# Patient Record
Sex: FEMALE | Employment: FULL TIME | URBAN - METROPOLITAN AREA
[De-identification: names, ages, dates, MRNs, and addresses within clinical notes are randomized per-mention and may not be internally consistent; named-entity substitution may affect disease eponyms.]

---

## 2022-02-11 ENCOUNTER — HOSPITAL ENCOUNTER (OUTPATIENT)
Dept: PHYSICAL THERAPY | Age: 22
Setting detail: THERAPIES SERIES
Discharge: HOME | End: 2022-02-11
Payer: COMMERCIAL

## 2022-02-11 PROCEDURE — 97161 PT EVAL LOW COMPLEX 20 MIN: CPT

## 2022-02-11 PROCEDURE — 97140 MANUAL THERAPY 1/> REGIONS: CPT

## 2022-02-11 NOTE — PLAN OF CARE
Rosalinda Puentes  Phone: [phone]   Fax:     [phone]                                                       Physical Therapy Certification    Dear Referring Practitioner: Mark Dias DO,    We had the pleasure of evaluating the following patient for physical therapy services at [address] [place] [address]. A summary of our findings can be found in the initial assessment below. This includes our plan of care. If you have any questions or concerns regarding these findings, please do not hesitate to contact me at the office phone number checked above. Thank you for the referral.       Physician Signature:_______________________________Date:__________________  By signing above (or electronic signature), therapists plan is approved by physician              Patient: Prescott Dakins   : 2000   MRN: [id number]  Referring Physician: Referring Practitioner: Mark Dias DO      Evaluation Date: 2022      Medical Diagnosis Information:  Diagnosis: bilateral elbow pain   Treatment Diagnosis: M25.521, M25.522                                         Insurance information: PT Insurance Information: [place] 20 visits    Precautions/ Contra-indications:   Latex Allergy:   [x]  NO      []YES  Preferred Language for Healthcare:   [x]English       []other:    C-SSRS Triggered by Intake questionnaire (Past 2 wk assessment ):   [x] No, Questionnaire did not trigger screening.   [] Yes, Patient intake triggered C-SSRS Screening      [] C-SSRS Screening completed  [] PCP notified via Epic     SUBJECTIVE: Patient stated complaint of bilateral elbow pain for several years duration. She is a club cheerleader who for [employer] and states that she has had pain since high school when she competed in gymnastics as well as cheerleading.  She states that most of her pain is during tumbling where her hands are on the ground and that she has taken a break from tumbling during cheer. She reports that she has some slight pain at rest following tumbling however, no  Increased night pain. She states that imaging done in high school revealed that she had open growth plates and that she likely would outgrow the pain when plates closed. She has not had recent imaging. She denies any diagnosis for EDS or other connective tissue disorder.     Relevant Medical History:NA  Functional Scale/Score: Quick DASH - 38%    Pain Scale: 3-8/10  Easing factors: rest  Provocative factors: tumbling, stunting     Type: []Constant   [x]Intermittent  []Radiating []Localized []other:     Numbness/Tingling: none    Occupation/School: student     Living Status/Prior Level of Function: Independent with ADLs and IADLs, collegiate club cheer    OBJECTIVE:     CERV ROM     Cervical Flexion WFL    Cervical Extension WFL    Cervical SB WFL WFL   Cervical rotation [facility] WFL        ROM Left Right   Shoulder Flex [facility]   Shoulder Abd     Shoulder ER     Shoulder IR     Elbow flexion [facility]   Elbow extension hyperextension noted Hyperextension noted   Strength  Left Right   Shoulder Flex 4+/5 4+/5   Shoulder Scap 4+/5 4+/5   Shoulder ER 4/5 4/5   Shoulder IR             Reflexes Normal Abnormal Comments   [x]ALL NORMAL            S1-2 Seated achilles [] []    S1-2 Prone knee bend [] []    L3-4 Patellar tendon [] []    C5-6 Biceps [] []    C6 Brachioradialis [] []    C7-8 Triceps [] []    Clonus [] []    Babinski [] []    Hillman's [] []      Reflexes/Sensation:    [x]Dermatomes/Myotomes intact    [x]Reflexes equal and normal bilaterally   []Other:    Joint mobility: elbow and wrist   [x]Normal    []Hypo   []Hyper    Palpation: tender through anterior and posterior elbow and through extensor mass of forearm    Functional Mobility/Transfers: WFL    Posture: WFL    Bandages/Dressings/Incisions: NA    Gait: (include devices/WB status): WNL     Orthopedic Special Tests: (-) varus stress at elbows, (+) vaglus stress for pain                       [x] Patient history, allergies, meds reviewed. Medical chart reviewed. See intake form. Review Of Systems (ROS):  [x]Performed Review of systems (Integumentary, CardioPulmonary, Neurological) by intake and observation. Intake form has been scanned into medical record. Patient has been instructed to contact their primary care physician regarding ROS issues if not already being addressed at this time. Co-morbidities/Complexities (which will affect course of rehabilitation):   [x]None           Arthritic conditions   []Rheumatoid arthritis (M05.9)  []Osteoarthritis (M19.91)   Cardiovascular conditions   []Hypertension (I10)  []Hyperlipidemia (E78.5)  []Angina pectoris (I20)  []Atherosclerosis (I70)  []CVA Musculoskeletal conditions   []Disc pathology   []Congenital spine pathologies   []Prior surgical intervention  []Osteoporosis (M81.8)  []Osteopenia (M85.8)   Endocrine conditions   []Hypothyroid (E03.9)  []Hyperthyroid Gastrointestinal conditions   []Constipation (M42.08)   Metabolic conditions   []Morbid obesity (E66.01)  []Diabetes type 1(E10.65) or 2 (E11.65)   []Neuropathy (G60.9)     Pulmonary conditions   []Asthma (J45)  []Coughing   []COPD (J44.9)   Psychological Disorders  []Anxiety (F41.9)  []Depression (F32.9)   []Other:   []Other:          Barriers to/and or personal factors that will affect rehab potential:              []Age  []Sex   []Smoker              []Motivation/Lack of Motivation                        []Co-Morbidities              []Cognitive Function, education/learning barriers              []Environmental, home barriers              []profession/work barriers  []past PT/medical experience  []other:  Justification:     Falls Risk Assessment (30 days):   [x] Falls Risk assessed and no intervention required.   [] Falls Risk assessed and Patient requires intervention due to being higher risk   TUG score (>12s at risk):     [] Falls education provided, including         ASSESSMENT: Patient presents with signs and symptoms consistent with hypermobility of both elbows and increases stress to ligamentous and tendinous structures. She may have some tendonitis noted through forearm extensors as well. She would benefit from skilled PT services to address above stated limitations and improve functional abilities. Functional Impairments:     []Noted spinal or UE joint hypomobility   [x]Noted spinal or UE joint hypermobility   []Decreased spinal/UE functional ROM   []Abnormal reflexes/sensation/myotomal/dermatomal deficits   [x]Decreased RC/scapular/core strength and neuromuscular control    [x]Decreased UE functional strength   []other:      Functional Activity Limitations (from functional questionnaire and intake)   [x]Reduced ability to tolerate prolonged functional positions   []Reduced ability or difficulty with changes of positions or transfers between positions   []Reduced ability to maintain good posture and demonstrate good body mechanics with sitting, bending, and lifting   [] Reduced ability or tolerance with driving and/or computer work   [x]Reduced ability to perform lifting, reaching, carrying tasks   []Reduced ability to reach behind back   []Reduced ability to sleep    [x]Reduced ability to tolerate any impact through UE or spine   []Reduced ability to  or hold objects   []Reduced ability to throw or toss an object   []other:    Participation Restrictions   []Reduced participation in self care activities   []Reduced participation in home management activities   []Reduced participation in work activities   [x]Reduced participation in social activities. [x]Reduced participation in sport/recreational activities. Classification/Subgrouping:   []signs/symptoms consistent with post-surgical status including decreased ROM, strength and function.     [x]signs/symptoms consistent with joint sprain/strain []signs/symptoms consistent with shoulder impingement (internal, external, primary or secondary)   [x]signs/symptoms consistent with shoulder/elbow/wrist tendinopathy   []Signs/symptoms consistent with Rotator cuff tear   []sign/symptoms consistent with labral tear   []signs/symptoms consistent with rib dysfunction   []signs/symptoms consistent with postural dysfunction   []signs/symptoms consistent with Glenohumeral IR Deficit - <45 degrees   []signs/symptoms consistent with facet dysfunction of cervical/thoracic spine   []signs/symptoms consistent with pathology which may benefit from Dry Needling   []signs/symptoms which may limit the use of advanced manual therapy techniques: (Elevated CV risk profile, recent trauma, intolerance to end range positions, prior TIA, visual issues, UE neurological compromise )     Prognosis/Rehab Potential:      []Excellent   [x]Good    []Fair   []Poor    Tolerance of evaluation/treatment:    []Excellent   [x]Good    []Fair   []Poor    Physical Therapy Evaluation Complexity Justification  [x] A history of present problem with:  [x] no personal factors and/or comorbidities that impact the plan of care;  []1-2 personal factors and/or comorbidities that impact the plan of care  []3 personal factors and/or comorbidities that impact the plan of care  [x] An examination of body systems using standardized tests and measures addressing any of the following: body structures and functions (impairments), activity limitations, and/or participation restrictions;:  [x] a total of 1-2 or more elements   [] a total of 3 or more elements   [] a total of 4 or more elements   [x] A clinical presentation with:  [x] stable and/or uncomplicated characteristics   [] evolving clinical presentation with changing characteristics  [] unstable and unpredictable characteristics;   [x] Clinical decision making of [x] low, [] moderate, [] high complexity using standardized patient assessment instrument and/or measurable assessment of functional outcome. [x] EVAL (LOW) 72335 (typically 20 minutes face-to-face)  [] EVAL (MOD) 91895 (typically 30 minutes face-to-face)  [] EVAL (HIGH) 65269 (typically 45 minutes face-to-face)  [] RE-EVAL     PLAN:   Frequency/Duration:  2 days per week for 4-6 Weeks:  Interventions:  [x]  Therapeutic exercise including: strength training, ROM, for scapula, core and Upper extremity, including postural re-education. [x]  NMR activation and proprioception for UE, periscapular and RC muscles and Core, including postural re-education. [x]  Manual therapy as indicated for shoulder, scapula, spine and associated soft tissue including: Dry Needling/IASTM, STM, PROM, Gr I-IV mobilizations, manipulation. [x] Modalities as needed that may include: thermal agents, E-stim, Biofeedback, US, iontophoresis as indicated  [x] Patient education on joint protection, postural re-education, activity modification, progression of HEP. HEP instruction: (see scanned forms)    GOALS:  Patient stated goal: pain free tumbling  [] Progressing: [] Met: [] Not Met: [] Adjusted    Therapist goals for Patient:   Short Term Goals: To be achieved in: 2 weeks  1. Independent in HEP and progression per patient tolerance, in order to prevent re-injury. [] Progressing: [] Met: [] Not Met: [] Adjusted  2. Patient will have a decrease in pain to facilitate improvement in movement, function, and ADLs as indicated by Functional Deficits. [] Progressing: [] Met: [] Not Met: [] Adjusted    Long Term Goals: To be achieved in: 4-6 weeks  1. Disability index score of 10% or less for the Quick DASH to assist with reaching prior level of function. [] Progressing: [] Met: [] Not Met: [] Adjusted  2. Patient will demonstrate increased AROM to [facility] to allow for proper joint functioning as indicated by Functional Deficits. [] Progressing: [] Met: [] Not Met: [] Adjusted  3.  Patient will demonstrate an increase in NM recruitment/activation and overall GH and scapular strength to within n5lbs HHD or WNL for proper functional mobility as indicated by patients Functional Deficits. [] Progressing: [] Met: [] Not Met: [] Adjusted  4. Patient will return to tumbling and stunting activities without increased symptoms or restriction.    [] Progressing: [] Met: [] Not Met: [] Adjusted      Electronically signed by:  Farhan Gray PT

## 2022-02-11 NOTE — FLOWSHEET NOTE
[facility]  [address] Rosalinda Yuen  Phone: [phone] Fax: [phone]    Physical Therapy Treatment Note/ Progress Report:     Date:  2022    Patient Name:  Arlen Lopes    :  2000  MRN: [id number]  Restrictions/Precautions:    Medical/Treatment Diagnosis Information:  · Diagnosis: bilateral elbow pain  · Treatment Diagnosis: M25.521, X83.652  Insurance/Certification information:  PT Insurance Information: [place] 20 visits  Physician Information:  Referring Practitioner: Mitchell Gutierrez DO  Plan of care signed (Y/N):     Date of Patient follow up with Physician:      Progress Report: []  Yes  []  No     Date Range for reporting period:  Beginnin2022  Ending:      Progress report due (10 Rx/or 30 days whichever is less): 1772     Recertification due (POC duration/ or 90 days whichever is less): 2022     Visit # Insurance Allowable Auth Needed   1 20 []Yes    []No     Pain level:  3-8/10     SUBJECTIVE:  See eval    OBJECTIVE: See eval   Observation:    Test measurements:      RESTRICTIONS/PRECAUTIONS:     Exercises/Interventions:   Therapeutic Ex (79282)  Min: Sets/sec Reps CUES/Notes   UBE      Pendulum/Ball rolls      Cane AAROM flex/press      3 way Isomet      T- band Row/pinch      T- band lower pinch      T- band ER activation      Supine SA punch      SL ER/SL punch      Prone Rows/ext      Prone HAB/Prone Flex      Seat Table slides/Wall Slides      Seated HH Depression      No Money      Scap Wall Lat touches/wall walks            Standing flex/scap      Standing Punch      Lawnmower      Banded elbow flexion/extension 1 10 red   Banded press and reverse fly 1 10 red               Manual Intervention  (53294)  Min: 15'      elbow Mobs/STM 15'     Post Cap mobs      Thoracic/Rib manipualtion      CT MT/Mobs      PROM MT      Elbow mobs            NMR re-education (88743)  Min:      T-spine Ext      [facility] depress/compress      Scap/GH NMR      Body blade      Wall ball roll      Wall Ball bounce      Ball drops      Kirsten Scap Bio      Floor Snow angels-sliders            Therapeutic Activity (59927)  Min:      UE throwing porgression      Dynamic UE stability      Earthquake Bar      Bodyblade                Therapeutic Exercise and NMR EXR  [x] (69304) Provided verbal/tactile cueing for activities related to strengthening, flexibility, endurance, ROM  for improvements in scapular, scapulothoracic and UE control with self care, reaching, carrying, lifting, house/yardwork, driving/computer work. [x] (92688) Provided verbal/tactile cueing for activities related to improving balance, coordination, kinesthetic sense, posture, motor skill, proprioception  to assist with  scapular, scapulothoracic and UE control with self care, reaching, carrying, lifting, house/yardwork, driving/computer work. Therapeutic Activities:    [] (47913 or 15153) Provided verbal/tactile cueing for activities related to improving balance, coordination, kinesthetic sense, posture, motor skill, proprioception and motor activation to allow for proper function of scapular, scapulothoracic and UE control with self care, carrying, lifting, driving/computer work.      Home Exercise Program:    [x] (85478) Reviewed/Progressed HEP activities related to strengthening, flexibility, endurance, ROM of scapular, scapulothoracic and UE control with self care, reaching, carrying, lifting, house/yardwork, driving/computer work  [] (28037) Reviewed/Progressed HEP activities related to improving balance, coordination, kinesthetic sense, posture, motor skill, proprioception of scapular, scapulothoracic and UE control with self care, reaching, carrying, lifting, house/yardwork, driving/computer work      Manual Treatments:  PROM / STM / Oscillations-Mobs:  G-I, II, III, IV (PA's, Inf., Post.)  [x] (22811) Provided manual therapy to mobilize soft tissue/joints of cervical/CT, scapular GHJ and UE for the purpose of modulating pain, promoting relaxation,  increasing ROM, reducing/eliminating soft tissue swelling/inflammation/restriction, improving soft tissue extensibility and allowing for proper ROM for normal function with self care, reaching, carrying, lifting, house/yardwork, driving/computer work    Modalities:      Charges:  Timed Code Treatment Minutes: 20   Total Treatment Minutes: 45       [x] EVAL (LOW) 12232 (typically 20 minutes face-to-face)  [] EVAL (MOD) 79822 (typically 30 minutes face-to-face)  [] EVAL (HIGH) 11770 (typically 45 minutes face-to-face)  [] RE-EVAL     [] PO(84691) x     [] DRY NEEDLE 1 OR 2 MUSCLES  [] NMR (99660) x     [] DRY NEEDLE 3+ MUSCLES  [x] Manual (17575) x 1      [] TA (76646) x     [] Mech Traction (20236)  [] ES(attended) (24053)     [] ES (un) (88891):   [] VASO (59424)  [] Other:    If [facility] Please Indicate Time In/Out  CPT Code Time in Time out                                   GOALS:  Patient stated goal: pain free tumbling  [] Progressing: [] Met: [] Not Met: [] Adjusted    Therapist goals for Patient:   Short Term Goals: To be achieved in: 2 weeks  1. Independent in HEP and progression per patient tolerance, in order to prevent re-injury. [] Progressing: [] Met: [] Not Met: [] Adjusted  2. Patient will have a decrease in pain to facilitate improvement in movement, function, and ADLs as indicated by Functional Deficits. [] Progressing: [] Met: [] Not Met: [] Adjusted    Long Term Goals: To be achieved in: 4-6 weeks  1. Disability index score of 10% or less for the Quick DASH to assist with reaching prior level of function. [] Progressing: [] Met: [] Not Met: [] Adjusted  2. Patient will demonstrate increased AROM to [facility] to allow for proper joint functioning as indicated by Functional Deficits. [] Progressing: [] Met: [] Not Met: [] Adjusted  3.  Patient will demonstrate an increase in NM recruitment/activation and overall GH and scapular strength to within n5lbs HHD or WNL for proper functional mobility as indicated by patients Functional Deficits. [] Progressing: [] Met: [] Not Met: [] Adjusted  4. Patient will return to tumbling and stunting activities without increased symptoms or restriction. [] Progressing: [] Met: [] Not Met: [] Adjusted      ASSESSMENT:  See eval    Return to Play: (if applicable)   []  Stage 1: Intro to Strength   []  Stage 2: Dynamic Strength and Intro to Plyometrics   []  Stage 3: Advanced Plyometrics and Intro to Throwing   []  Stage 4: Sport specific Training/Return to Sport     []  Ready to Return to Play, [employer]   []  Not Ready for Return to Sports   Comments:      Treatment/Activity Tolerance:  [x] Patient tolerated treatment well [] Patient limited by fatique  [] Patient limited by pain  [] Patient limited by other medical complications  [] Other:     Overall Progression Towards Functional goals/ Treatment Progress Update:  [] Patient is progressing as expected towards functional goals listed. [] Progression is slowed due to complexities/Impairments listed. [] Progression has been slowed due to co-morbidities. [x] Plan just implemented, too soon to assess goals progression <30days   [] Goals require adjustment due to lack of progress  [] Patient is not progressing as expected and requires additional follow up with physician  [] Other    Prognosis for POC: [x] Good [] Fair  [] Poor    Patient requires continued skilled intervention: [x] Yes  [] No      PLAN: See eval  [] Continue per plan of care [] Alter current plan (see comments)  [x] Plan of care initiated [] Hold pending MD visit [] Discharge    Electronically signed by: Nico Amanda PT     Note: If patient does not return for scheduled/recommended follow up visits, this note will serve as a discharge from care along with the most recent update on progress.

## 2022-02-16 ENCOUNTER — APPOINTMENT (OUTPATIENT)
Dept: PHYSICAL THERAPY | Age: 22
End: 2022-02-16
Payer: COMMERCIAL

## 2022-02-18 ENCOUNTER — APPOINTMENT (OUTPATIENT)
Dept: PHYSICAL THERAPY | Age: 22
End: 2022-02-18
Payer: COMMERCIAL